# Patient Record
Sex: MALE | Race: WHITE | Employment: UNEMPLOYED | URBAN - METROPOLITAN AREA
[De-identification: names, ages, dates, MRNs, and addresses within clinical notes are randomized per-mention and may not be internally consistent; named-entity substitution may affect disease eponyms.]

---
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
## 2022-03-19 PROBLEM — R11.0 NAUSEA: Status: ACTIVE | Noted: 2017-05-04

## 2022-03-20 PROBLEM — R63.0 LOSS OF APPETITE: Status: ACTIVE | Noted: 2017-05-04

## 2023-05-22 RX ORDER — IBUPROFEN 200 MG
TABLET ORAL
COMMUNITY